# Patient Record
Sex: FEMALE | Race: WHITE
[De-identification: names, ages, dates, MRNs, and addresses within clinical notes are randomized per-mention and may not be internally consistent; named-entity substitution may affect disease eponyms.]

---

## 2019-06-17 ENCOUNTER — HOSPITAL ENCOUNTER (INPATIENT)
Dept: HOSPITAL 4 - SED | Age: 79
LOS: 3 days | Discharge: NURSING, REHAB OR LONG-TERM CARE | DRG: 563 | End: 2019-06-20
Payer: COMMERCIAL

## 2019-06-17 VITALS — SYSTOLIC BLOOD PRESSURE: 110 MMHG | HEIGHT: 67 IN | WEIGHT: 182 LBS | BODY MASS INDEX: 28.56 KG/M2

## 2019-06-17 VITALS — SYSTOLIC BLOOD PRESSURE: 152 MMHG
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
## 2019-06-17 DIAGNOSIS — I48.91: ICD-10-CM

## 2019-06-17 DIAGNOSIS — Z79.01: ICD-10-CM

## 2019-06-17 DIAGNOSIS — E11.9: ICD-10-CM

## 2019-06-17 DIAGNOSIS — Z79.899: ICD-10-CM
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
## 2019-06-17 DIAGNOSIS — S42.252A: ICD-10-CM

## 2019-06-17 DIAGNOSIS — Z90.49: ICD-10-CM

## 2019-06-17 DIAGNOSIS — K21.9: ICD-10-CM

## 2019-06-17 DIAGNOSIS — Z91.041: ICD-10-CM

## 2019-06-17 DIAGNOSIS — S09.90XA: ICD-10-CM

## 2019-06-17 DIAGNOSIS — S00.81XA: ICD-10-CM

## 2019-06-17 DIAGNOSIS — E44.0: ICD-10-CM

## 2019-06-17 DIAGNOSIS — R26.9: ICD-10-CM

## 2019-06-17 DIAGNOSIS — Z85.3: ICD-10-CM

## 2019-06-17 DIAGNOSIS — S42.201A: Primary | ICD-10-CM

## 2019-06-17 DIAGNOSIS — C85.90: ICD-10-CM
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
## 2019-06-17 RX ADMIN — MORPHINE SULFATE PRN MG: 2 INJECTION, SOLUTION INTRAMUSCULAR; INTRAVENOUS at 23:56

## 2019-06-17 NOTE — NUR
ADMISSION NOTE

Received patient from ER via gurney. Patient admitted with diagnosis of Bilateral Shoulder 
Fracture. Patient is awake, alert, oriented X 4. Patient oriented to hospital room, call 
light, toileting, pain management and safety-teach back done. Patient informed that EDWARD Anna will be primary nurse and that their room number is 128A. Personal belongings checked and 
Belongings List documented. Call light within reach.

## 2019-06-17 NOTE — NUR
# 24 gauge angiocath placed to R hand.  Use of asceptic technique.  Opsite 
placed over site.  Blood return noted.  Flushed with 10 cc of normal saline.  
No evidence of infiltration noted.  Patient tolerated well.

## 2019-06-17 NOTE — NUR
-------------------------------------------------------------------------------

           *** Note undone in EDM - 06/17/19 at 1741 by [email or web address] ***            

-------------------------------------------------------------------------------

Pt AAOx4 BIB BLS c/o 4/10 pain to R upper arm s/p mechanical trip and fall. Pt 
fell forward on face, denies KO/N/V/blurred vision. Abrasion noted to bridge of 
nose. No deformites noted to R arm, but unable to lift arm above shoulder. + 
CMS to site. No

## 2019-06-17 NOTE — NUR
-------------------------------------------------------------------------------

           *** Note marcy in ED - 06/17/19 at 1740 by [email or web address] ***            

-------------------------------------------------------------------------------

Patient to ER bed h1 for evaluation. Side rails up.

## 2019-06-17 NOTE — NUR
Initial Note

Received patient awake, alert and oriented. Husand at the bedside. No SOB noted. Denies any 
n/v at this time. Complain of BUE pain especially when she moves her shoulders. Will 
medicate for pain. Saline lock flushed. Skin tear noted on left elbow, red 
discoloration/ecchymoses noted on the bridge of the nose and ecchymoses noted on left arm 
and back. No peripheral edema noted. Ambulatory with assist. Fall/safety precaution 
observed. Call light within reach. Bed alarm on and at lowest position at all times. Care 
and monitoring will be provided per protocol. Needs attended. Kept warm and comfortable. 
Assessment done. Oriented to room, call light and hospital policies.

## 2019-06-17 NOTE — NUR
Medication reconciliation completed with information provided by PATIENT'S 
LIST. Any prior medication reconciliation on file was reviewed and corrected.

## 2019-06-17 NOTE — NUR
Pt AAOx4 BIB BLS c/o 4/10 pain to R upper arm s/p mechanical trip and fall. Pt 
fell forward on face, denies KO/N/V/blurred vision. Abrasion noted to bridge of 
nose. No deformites noted to R arm, but unable to lift arm above shoulder. + 
CMS to site. No other injuries/complaints per pt/noted. Will continue to 
monitor.

## 2019-06-17 NOTE — NUR
BROUGHT IN BY [facility], PLACED IN HALLWAY BED AND TRIAGED. REPORT 
GIVEN TO  DAVONTE

## 2019-06-18 VITALS — SYSTOLIC BLOOD PRESSURE: 121 MMHG

## 2019-06-18 VITALS — SYSTOLIC BLOOD PRESSURE: 146 MMHG

## 2019-06-18 VITALS — SYSTOLIC BLOOD PRESSURE: 132 MMHG

## 2019-06-18 VITALS — SYSTOLIC BLOOD PRESSURE: 135 MMHG

## 2019-06-18 LAB
ALBUMIN SERPL BCP-MCNC: 2.7 G/DL (ref 3.4–4.8)
ALT SERPL W P-5'-P-CCNC: 21 U/L (ref 12–78)
ANION GAP SERPL CALCULATED.3IONS-SCNC: 7 MMOL/L (ref 5–15)
AST SERPL W P-5'-P-CCNC: 39 U/L (ref 10–37)
BASOPHILS # BLD AUTO: 0 K/UL (ref 0–0.2)
BASOPHILS NFR BLD AUTO: 0.4 % (ref 0–2)
BILIRUB SERPL-MCNC: 1.3 MG/DL (ref 0–1)
BUN SERPL-MCNC: 14 MG/DL (ref 8–21)
CALCIUM SERPL-MCNC: 9.4 MG/DL (ref 8.4–11)
CHLORIDE SERPL-SCNC: 101 MMOL/L (ref 98–107)
CREAT SERPL-MCNC: 0.66 MG/DL (ref 0.55–1.3)
EOSINOPHIL # BLD AUTO: 0 K/UL (ref 0–0.4)
EOSINOPHIL NFR BLD AUTO: 0.5 % (ref 0–4)
ERYTHROCYTE [DISTWIDTH] IN BLOOD BY AUTOMATED COUNT: 15.2 % (ref 9–15)
GFR SERPL CREATININE-BSD FRML MDRD: (no result) ML/MIN (ref 90–?)
GLUCOSE SERPL-MCNC: 157 MG/DL (ref 70–99)
HCT VFR BLD AUTO: 36.8 % (ref 36–48)
HGB BLD-MCNC: 12.4 G/DL (ref 12–16)
LYMPHOCYTES # BLD AUTO: 1.1 K/UL (ref 1–5.5)
LYMPHOCYTES NFR BLD AUTO: 12.6 % (ref 20.5–51.5)
MCH RBC QN AUTO: 34 PG (ref 27–31)
MCHC RBC AUTO-ENTMCNC: 34 % (ref 32–36)
MCV RBC AUTO: 102 FL (ref 79–98)
MONOCYTES # BLD MANUAL: 0.9 K/UL (ref 0–1)
MONOCYTES # BLD MANUAL: 10.8 % (ref 1.7–9.3)
NEUTROPHILS # BLD AUTO: 6.6 K/UL (ref 1.8–7.7)
NEUTROPHILS NFR BLD AUTO: 75.7 % (ref 40–70)
PLATELET # BLD AUTO: 192 K/UL (ref 130–430)
POTASSIUM SERPL-SCNC: 4.7 MMOL/L (ref 3.5–5.1)
RBC # BLD AUTO: 3.62 MIL/UL (ref 4.2–6.2)
SODIUM SERPLBLD-SCNC: 136 MMOL/L (ref 136–145)
WBC # BLD AUTO: 8.7 K/UL (ref 4.8–10.8)

## 2019-06-18 RX ADMIN — MORPHINE SULFATE PRN MG: 2 INJECTION, SOLUTION INTRAMUSCULAR; INTRAVENOUS at 04:03

## 2019-06-18 RX ADMIN — MORPHINE SULFATE PRN MG: 2 INJECTION, SOLUTION INTRAMUSCULAR; INTRAVENOUS at 08:02

## 2019-06-18 RX ADMIN — MORPHINE SULFATE PRN MG: 2 INJECTION, SOLUTION INTRAMUSCULAR; INTRAVENOUS at 21:36

## 2019-06-18 RX ADMIN — Medication SCH EA: at 11:15

## 2019-06-18 RX ADMIN — Medication SCH EA: at 22:08

## 2019-06-18 RX ADMIN — MORPHINE SULFATE PRN MG: 2 INJECTION, SOLUTION INTRAMUSCULAR; INTRAVENOUS at 12:56

## 2019-06-18 RX ADMIN — Medication SCH EA: at 06:03

## 2019-06-18 RX ADMIN — ONDANSETRON PRN MG: 2 INJECTION INTRAMUSCULAR; INTRAVENOUS at 17:39

## 2019-06-18 RX ADMIN — Medication SCH EA: at 17:20

## 2019-06-18 NOTE — NUR
RN Note

Assisted patient to the bathroom, weak, unsteady and feels pain on both shoulders. Assisted 
back to bed.  at the bedside. Kept warm and comfortable.

## 2019-06-18 NOTE — NUR
MD Rounds/orthopedic

Seen and examined by DR. El discussed plan of care , safety, pain management , follow up 
with orthopedic doctor on Monday, due pain medicatyion given , movement in left arm is more 
limited compare to left arm , denies any tingling sensation , no swelling in the arm , mild 
swelling bilasteral shoulder, will monitor.

## 2019-06-18 NOTE — NUR
ACTIVITY

Pt unable to tolerate lying on right & left sides due to fractures of both shoulders.  Pt 
does get up with assistance to ambulate to bathroom.

## 2019-06-18 NOTE — NUR
Pain med

Medicated for BUE/shoulders pain per patient's request. Will continue to monitor. Given 
snacks as well.

## 2019-06-18 NOTE — NUR
CONSULTATION PAGED



REASON FOR CONSULTATION: BILATERAL SHOULDER FRACTURE

WAS CONSULT CALLED? YES

PERSON WHO WAS NOTIFIED: JENNIFER

CONSULTING PHYSICIAN: DR. SOARES / DR. JONES ON CALL

CONSULTANT SPECIALTY: ORTHO

CONSULTANT PHONE NUMBER: [phone]

ORDERING PHYSICIAN: DR. JUAN

## 2019-06-18 NOTE — NUR
End Note

Afebrile. VS stable.  at the bedside, No complain of SOB or n/v throughout the night. 
Medicated for bilateral shoulders pain twice since admission. Saline lock. Latest blood 
sugar was 151, refused insulin. Assisted to the bathroom with commode and back in bed. 
Dressing on left elbow CDI. Call light within reach. Bed alarm on and at lowest position at 
all times. Care and monitoring provided per protocol. Needs attended. Kept warm and 
comfortable. Fall precautions observed. Refused SCDs.

## 2019-06-18 NOTE — NUR
ASSESSMENT

Pt alert/oriented to self, time, place.  Bilateral shoulders fractured.  No c/o numbing or 
tingling of bilateral upper extremities.  Pulses palpable.  Pt able to wiggle fingers 
freely.  Pt has limited range of motion of both upper arms.  Saline lock present right hand 
24ga. no redness or swelling noted at site.  Pt gets up to bathroom with assistance.

## 2019-06-18 NOTE — NUR
Pain med

Given pain medication per patient's request. Comfort measures provided. Will continue to 
monitor.

## 2019-06-18 NOTE — NUR
IV ACCESS

Right hand 24ga leaking, catheter removed intact. IV site restarted after one attempt in 
right lateral side of hand with 22ga. pt tolerated procedure well.

## 2019-06-18 NOTE — NUR
Nutrition Update



Yo Scale 18 noted.

Pt admitted for Bilateral Shoulder Fracture

Diet: [facility]

BMI: 28.5 kg/m2

RD to follow per nutrition care standards.

## 2019-06-18 NOTE — NUR
Safety/education

Kept with bilateral sling both arm , able to get out of bed to bath room with assist patient 
too scared and worried, morning care given, safety/fall precaution initiated , needs 
attended ,  at the bed side health teaching regarding safety /pain management 
instructed verbalized understanding.

## 2019-06-19 VITALS — SYSTOLIC BLOOD PRESSURE: 143 MMHG

## 2019-06-19 VITALS — SYSTOLIC BLOOD PRESSURE: 156 MMHG

## 2019-06-19 VITALS — SYSTOLIC BLOOD PRESSURE: 129 MMHG

## 2019-06-19 VITALS — SYSTOLIC BLOOD PRESSURE: 151 MMHG

## 2019-06-19 VITALS — SYSTOLIC BLOOD PRESSURE: 116 MMHG

## 2019-06-19 LAB
ANION GAP SERPL CALCULATED.3IONS-SCNC: 6 MMOL/L (ref 5–15)
BASOPHILS # BLD AUTO: 0.1 K/UL (ref 0–0.2)
BASOPHILS NFR BLD AUTO: 0.6 % (ref 0–2)
BUN SERPL-MCNC: 19 MG/DL (ref 8–21)
CALCIUM SERPL-MCNC: 9.4 MG/DL (ref 8.4–11)
CHLORIDE SERPL-SCNC: 99 MMOL/L (ref 98–107)
CREAT SERPL-MCNC: 0.69 MG/DL (ref 0.55–1.3)
EOSINOPHIL # BLD AUTO: 0.2 K/UL (ref 0–0.4)
EOSINOPHIL NFR BLD AUTO: 2.6 % (ref 0–4)
ERYTHROCYTE [DISTWIDTH] IN BLOOD BY AUTOMATED COUNT: 15.3 % (ref 9–15)
GFR SERPL CREATININE-BSD FRML MDRD: (no result) ML/MIN (ref 90–?)
GLUCOSE SERPL-MCNC: 148 MG/DL (ref 70–99)
HCT VFR BLD AUTO: 33.6 % (ref 36–48)
HGB BLD-MCNC: 11.5 G/DL (ref 12–16)
LYMPHOCYTES # BLD AUTO: 1.7 K/UL (ref 1–5.5)
LYMPHOCYTES NFR BLD AUTO: 18.9 % (ref 20.5–51.5)
MCH RBC QN AUTO: 35 PG (ref 27–31)
MCHC RBC AUTO-ENTMCNC: 34 % (ref 32–36)
MCV RBC AUTO: 102 FL (ref 79–98)
MONOCYTES # BLD MANUAL: 1.1 K/UL (ref 0–1)
MONOCYTES # BLD MANUAL: 12 % (ref 1.7–9.3)
NEUTROPHILS # BLD AUTO: 5.9 K/UL (ref 1.8–7.7)
NEUTROPHILS NFR BLD AUTO: 65.9 % (ref 40–70)
PLATELET # BLD AUTO: 193 K/UL (ref 130–430)
POTASSIUM SERPL-SCNC: 3.9 MMOL/L (ref 3.5–5.1)
RBC # BLD AUTO: 3.29 MIL/UL (ref 4.2–6.2)
SODIUM SERPLBLD-SCNC: 133 MMOL/L (ref 136–145)
WBC # BLD AUTO: 8.9 K/UL (ref 4.8–10.8)

## 2019-06-19 RX ADMIN — ONDANSETRON PRN MG: 2 INJECTION INTRAMUSCULAR; INTRAVENOUS at 05:00

## 2019-06-19 RX ADMIN — MORPHINE SULFATE PRN MG: 2 INJECTION, SOLUTION INTRAMUSCULAR; INTRAVENOUS at 21:33

## 2019-06-19 RX ADMIN — RIVAROXABAN SCH MG: 10 TABLET, FILM COATED ORAL at 09:00

## 2019-06-19 RX ADMIN — MORPHINE SULFATE PRN MG: 2 INJECTION, SOLUTION INTRAMUSCULAR; INTRAVENOUS at 05:01

## 2019-06-19 RX ADMIN — Medication SCH EA: at 11:13

## 2019-06-19 RX ADMIN — GLIMEPIRIDE SCH MG: 2 TABLET ORAL at 12:13

## 2019-06-19 RX ADMIN — MORPHINE SULFATE PRN MG: 2 INJECTION, SOLUTION INTRAMUSCULAR; INTRAVENOUS at 14:01

## 2019-06-19 RX ADMIN — MORPHINE SULFATE PRN MG: 2 INJECTION, SOLUTION INTRAMUSCULAR; INTRAVENOUS at 18:21

## 2019-06-19 RX ADMIN — Medication SCH EA: at 07:00

## 2019-06-19 RX ADMIN — Medication SCH EA: at 17:16

## 2019-06-19 RX ADMIN — DIGOXIN SCH MG: 125 TABLET ORAL at 09:28

## 2019-06-19 RX ADMIN — FAMOTIDINE SCH MG: 20 TABLET, FILM COATED ORAL at 09:00

## 2019-06-19 RX ADMIN — Medication SCH EA: at 20:47

## 2019-06-19 RX ADMIN — MORPHINE SULFATE PRN MG: 2 INJECTION, SOLUTION INTRAMUSCULAR; INTRAVENOUS at 09:29

## 2019-06-19 RX ADMIN — LETROZOLE SCH MG: 2.5 TABLET, FILM COATED ORAL at 09:35

## 2019-06-19 NOTE — NUR
Blood sugar 156 mg/dl refused for insulin, seen and examined by Dr. Mascorro discussed plan of 
care possible discharge to rehab tomorrow.

## 2019-06-19 NOTE — NUR
OOB



PATIENT ASSISTED OUT OF BED TO RESTROOM AND BACK TO BED WITH GAIT BELT UTILIZED. BED ALARM 
ON.

## 2019-06-19 NOTE — NUR
Patient wake/alert worried regarding her medication, refused to use the sling to both arm , 
she said felt more comfortable hand supported by pillow, swelling in the shoulder +2 able to 
move arm but limited due to pain , discussed plan of care /possible discharge to SNF for 
rehab ,fall safety precaution initiated.

## 2019-06-19 NOTE — NUR
:

After consulting with Ashleigh Diaz, MSW shared with her that she did an assessment with 
pts'  Shahbaz Liz. MSW will be filing a APS report based on Rn's concerns.



MSW filed an APS report via internet ID [id number].

## 2019-06-19 NOTE — NUR
Discharge Planning:

DCP faxed pt referral to [facility] ([phone] p [phone]) DCP to follow up.

## 2019-06-19 NOTE — NUR
Mobility

Out of bed to bathroom using gait belt , refused to use sling bilateral arm , with assist no 
dizziness tolerates well, fall /safety precaution initiated.

## 2019-06-19 NOTE — NUR
OPENING NOTES



RECEIVED PATIENT IN BED.  AT BEDSIDE. BREATHING UNLABORED ON ROOM AIR. PLAN OF CARE 
REVIEWED WITH PATIENT. BED IN LOWEST LOCKED POSITION WITH BED ALARM ON.

## 2019-06-19 NOTE — NUR
OOB



PATIENT ASSISTED OUT OF BED TO REST ROOM AND BACK TO BED WITH GAIT BELT IN USED. BED ALARM 
ON. BED LOCKED IN LOWEST POSITION.

## 2019-06-19 NOTE — NUR
PAIN MGT



PATIENT MEDICATED WITH MORPHINE AS ORDERED FOR C/O RT ARM AND RT SHOULDER PAIN 10/10. VITAL 
SIGNS STABLE.

## 2019-06-19 NOTE — NUR
Out of bed to bathroom using gait belt refused for sling , tolerates well , fall safety 
precaution initiated.

## 2019-06-19 NOTE — NUR
:

MSW met with Pt. bedside. Pt. stated she did not ask to see MSW/. Pt. stated 
she was worried about her  because  he had been at the hospital worried and caring 
for her. Pt. stated her  had such a lack of sleep, but reports that he is better. Any 
concerns she had have passes and feel they worries were due to his lack of sleep. She 
reports he is better and there is no need for any type of intervention. MSW stated she is 
located at the hospital and is available to pt. if she feels she has any concerns. 

-------------------------------------------------------------------------------

Addendum: 06/19/19 at 1231 by Vera FUNES

-------------------------------------------------------------------------------

:

MSW met with pt. bedside, this time her , Shahbaz

 was present. This gave MSW an opportunity to do an assessment on him. Pt. stated she was 
doing ok. Mr. Liz stated he was doing well. He said he was hard of hearing and does not 
have any hearing aids as they would cost $5000. He stated he only pays attention to what he 
has to. He was pleasant, alert and friendly. Pt. stated when she fell 3 young boys came to 
assist her. When asked about family support, pt. said she has a daughter whom she raised by 
herself and a granddaughter. Both have not been helpful and pt. stated they have abandoned 
her. She has been without their support since she wad Dx. with Cancer and was getting 
Treatment at [facility]. Pt. grew teary eyed as she stated her  called to tell her 
daughter of her most recent fall. Pt. daughter replied, "I'm not at home" and has not 
reached out. Pt. stated her , Shahbaz's son, who is well of told his dad 

don't call me, I will call you.  



Pt. stated she is going to a nursing facility to get stronger. Pt. stated they have good 
neighbors who will check in with Shahbaz while she is in a temporary nursing facility. Shahbaz said 
she has a 24 hour a week job at [employer], so he will keep busy when he is not visiting his 
wife. Both Pt. and  stated they don't have any needs at this time. They thanked MSW. 



MSW will remain available as needed.

-------------------------------------------------------------------------------

Addendum: 06/19/19 at 1336 by Vera FUNES

-------------------------------------------------------------------------------

: 

MSW spoke with pts'  Shahbaz more thoroughly. Shahbaz and pt. stated he is safe to be at 
home. He has not been hospitalized. He knows how to cook and goes grocery shopping, drives 
himself. Pt. stated her skilled facility is close to their  home  and he will be alble to go 
to have meals there with her. Both Pt. and  felt he was going to be well as she 
recoperates at [facility]. When asked. Pt. stated Shahbaz has been assessed 
by their primary care phys. Dr. Macias. MSW thanked the couple for  answering all of her 
question. MSW will remain available as needed.

## 2019-06-19 NOTE — NUR
P.T. NOTES

AFTER MULTIPLE ATTEMPTS PATIENT REFUSED TO BE SEEN BY P.T., STATES NOT FEELING WELL AND IN 
PAIN.

## 2019-06-19 NOTE — NUR
Patient more anxious and forgetful saying that nobody see her since she came to hospital , 
reminded her Dr. El and DR. Mascorro both  seen and examined the patient yesterday.

## 2019-06-19 NOTE — NUR
ACCOMPANIED PATIENT TO THE BATHROOM

       ACCOMPANIED PATIENT TO THE BATHROOM, WAITED OUTSIDE THE BATHROOM TILL PATIENT WENT 
BACK TO BED. PLACED PATIENT ON COMFORTABLE POSITION, INSIDE THE ROOM, HELPING PATIENT 
TO GO BACK IN BED. LEFT THE ROOM AND AFTER FEW MINUTES,  COMPLAINED TO  
THAT NO ONE CAME TO HELP PATIENT TO THE BATHROOM, PT WAS CRYING WHILE IN BED. NURSE ALEXIS 
WENT BACK AND CHECKED PATIENT LIKE NOT HAPPEN TO PATIENT.

## 2019-06-20 VITALS — SYSTOLIC BLOOD PRESSURE: 173 MMHG

## 2019-06-20 VITALS — SYSTOLIC BLOOD PRESSURE: 164 MMHG

## 2019-06-20 VITALS — SYSTOLIC BLOOD PRESSURE: 139 MMHG

## 2019-06-20 VITALS — SYSTOLIC BLOOD PRESSURE: 147 MMHG

## 2019-06-20 LAB
ANION GAP SERPL CALCULATED.3IONS-SCNC: 8 MMOL/L (ref 5–15)
BASOPHILS # BLD AUTO: 0.1 K/UL (ref 0–0.2)
BASOPHILS NFR BLD AUTO: 0.6 % (ref 0–2)
BUN SERPL-MCNC: 20 MG/DL (ref 8–21)
CALCIUM SERPL-MCNC: 8.9 MG/DL (ref 8.4–11)
CHLORIDE SERPL-SCNC: 99 MMOL/L (ref 98–107)
CREAT SERPL-MCNC: 0.83 MG/DL (ref 0.55–1.3)
EOSINOPHIL # BLD AUTO: 0 K/UL (ref 0–0.4)
EOSINOPHIL NFR BLD AUTO: 0.3 % (ref 0–4)
ERYTHROCYTE [DISTWIDTH] IN BLOOD BY AUTOMATED COUNT: 15.3 % (ref 9–15)
GFR SERPL CREATININE-BSD FRML MDRD: (no result) ML/MIN (ref 90–?)
GLUCOSE SERPL-MCNC: 150 MG/DL (ref 70–99)
HCT VFR BLD AUTO: 33.9 % (ref 36–48)
HGB BLD-MCNC: 11.1 G/DL (ref 12–16)
LYMPHOCYTES # BLD AUTO: 1.1 K/UL (ref 1–5.5)
LYMPHOCYTES NFR BLD AUTO: 10.1 % (ref 20.5–51.5)
MCH RBC QN AUTO: 34 PG (ref 27–31)
MCHC RBC AUTO-ENTMCNC: 33 % (ref 32–36)
MCV RBC AUTO: 102 FL (ref 79–98)
MONOCYTES # BLD MANUAL: 1.6 K/UL (ref 0–1)
MONOCYTES # BLD MANUAL: 14.4 % (ref 1.7–9.3)
NEUTROPHILS # BLD AUTO: 8.4 K/UL (ref 1.8–7.7)
NEUTROPHILS NFR BLD AUTO: 74.6 % (ref 40–70)
PLATELET # BLD AUTO: 151 K/UL (ref 130–430)
POTASSIUM SERPL-SCNC: 3.8 MMOL/L (ref 3.5–5.1)
RBC # BLD AUTO: 3.32 MIL/UL (ref 4.2–6.2)
SODIUM SERPLBLD-SCNC: 138 MMOL/L (ref 136–145)
WBC # BLD AUTO: 11.2 K/UL (ref 4.8–10.8)

## 2019-06-20 RX ADMIN — Medication SCH EA: at 11:54

## 2019-06-20 RX ADMIN — RIVAROXABAN SCH MG: 10 TABLET, FILM COATED ORAL at 08:23

## 2019-06-20 RX ADMIN — MORPHINE SULFATE PRN MG: 2 INJECTION, SOLUTION INTRAMUSCULAR; INTRAVENOUS at 09:31

## 2019-06-20 RX ADMIN — Medication SCH EA: at 06:14

## 2019-06-20 RX ADMIN — DIGOXIN SCH MG: 125 TABLET ORAL at 08:22

## 2019-06-20 RX ADMIN — FAMOTIDINE SCH MG: 20 TABLET, FILM COATED ORAL at 08:21

## 2019-06-20 RX ADMIN — GLIMEPIRIDE SCH MG: 2 TABLET ORAL at 11:54

## 2019-06-20 RX ADMIN — LETROZOLE SCH MG: 2.5 TABLET, FILM COATED ORAL at 08:56

## 2019-06-20 NOTE — NUR
TRANSFER



PATIENT MOVED TO ROOM 120-A FOR SAFETY/FALL PRECAUTION. ROOM CLOSER TO NURSES STATION. 
 AT BEDSIDE. BED ALARM ON. CALL LIGHT WITH IN REACH. BED IN LOWEST LOCKED POSITION.

## 2019-06-20 NOTE — NUR
Discharge Planning:

Javier ([phone] p [phone]) Rm 3A, Medic1 ([phone]) 3:00pm P/U. Nurse made 
aware and pt packet taken to nurse station.

## 2019-06-20 NOTE — NUR
pain medication

patient complaining of bilateral shoulder pain, educated on medication use and side effects, 
patient verbalized understanding, no other needs at this time, fall/safety precautions in 
place.

-------------------------------------------------------------------------------

Addendum: 06/20/19 at 1610 by Anjali Crandall RN

-------------------------------------------------------------------------------

[id number] entry

## 2019-06-20 NOTE — NUR
CLOSING NOTES



PATIENT RESTING IN BED. BREATHING UNLABORED ON ROOM AIR. IV LINE INTACT. PATIENT NEEDS 
ATTENDED. BED IN LOWEST LOCKED POSITION WITH ALARM ON. PATIENT STILL REFUSING TO WEAR ARM 
SLING. CALL LIGHT WITH IN REACH.

## 2019-06-20 NOTE — NUR
opening note

patient is resting in bed,  at the bedside, educated on call light system and plan of 
care, patient verbalized understanding, patient is able to state name and , IV site clean 
and dressing intact, no other needs at this time, fall/safety precautions in place.

## 2019-06-20 NOTE — NUR
accuchek and scheduled medications

patient is resting in bed, accuchek done and no sliding scale needed at this time, educated 
on medication use and side effects, patient verbalized understanding, informed patient that 
she will be going to [place] this afternoon, patient verbalized understanding, no other needs 
at this time, fall/safety precautions in place.

## 2019-06-20 NOTE — NUR
pain medication

patient complaining of bilateral shoulder pain, educated on medication use and side effects, 
patient verbalized understanding, no other needs at this time, fall/safety precautions in 
place.

## 2019-06-20 NOTE — NUR
OOB



PATIENT ASSISTED OUT OF BED TO BEDSIDE COMMODE AND BACK TO BED. BED ALARM ON. PATIENT STILL 
REFUSING TO USE ARM SLING.

## 2024-05-08 NOTE — NUR
Patient will be admitted to care of Mascorro .  Admitted to Med surg unit.  Will 
go to room 122A. Summary report printed. Report will be given at bedside. Hide Include Location In Plan Question?: No Detail Level: Detailed